# Patient Record
Sex: MALE | Race: ASIAN | NOT HISPANIC OR LATINO | Employment: FULL TIME | URBAN - NONMETROPOLITAN AREA
[De-identification: names, ages, dates, MRNs, and addresses within clinical notes are randomized per-mention and may not be internally consistent; named-entity substitution may affect disease eponyms.]

---

## 2023-03-22 ENCOUNTER — OFFICE VISIT (OUTPATIENT)
Dept: UROLOGY | Facility: CLINIC | Age: 43
End: 2023-03-22
Payer: MEDICAID

## 2023-03-22 VITALS — BODY MASS INDEX: 22.5 KG/M2 | WEIGHT: 140 LBS | HEIGHT: 66 IN

## 2023-03-22 DIAGNOSIS — R39.89 GENITAL SORE: ICD-10-CM

## 2023-03-22 DIAGNOSIS — B00.9 HERPES SIMPLEX VIRUS INFECTION: Primary | ICD-10-CM

## 2023-03-22 PROCEDURE — 1160F RVW MEDS BY RX/DR IN RCRD: CPT

## 2023-03-22 PROCEDURE — 86592 SYPHILIS TEST NON-TREP QUAL: CPT

## 2023-03-22 PROCEDURE — 99203 OFFICE O/P NEW LOW 30 MIN: CPT

## 2023-03-22 PROCEDURE — 86593 SYPHILIS TEST NON-TREP QUANT: CPT

## 2023-03-22 PROCEDURE — 1159F MED LIST DOCD IN RCRD: CPT

## 2023-03-22 PROCEDURE — 86780 TREPONEMA PALLIDUM: CPT

## 2023-03-22 RX ORDER — CLOTRIMAZOLE AND BETAMETHASONE DIPROPIONATE 10; .64 MG/G; MG/G
1 CREAM TOPICAL 2 TIMES DAILY
Qty: 45 G | Refills: 2 | Status: SHIPPED | OUTPATIENT
Start: 2023-03-22

## 2023-03-22 RX ORDER — ACYCLOVIR 400 MG/1
TABLET ORAL
Qty: 60 TABLET | Refills: 0 | Status: SHIPPED | OUTPATIENT
Start: 2023-03-22

## 2023-03-22 NOTE — PROGRESS NOTES
"Chief Complaint:    Chief Complaint   Patient presents with   • Infected testicle     New patient       Vital Signs:   Ht 167.6 cm (66\")   Wt 63.5 kg (140 lb)   BMI 22.60 kg/m²   Body mass index is 22.6 kg/m².      HPI:  Javier Hui is a 42 y.o. male who presents today for initial evaluation     History of Present Illness  Mr. Hui presents to the clinic herniotomy for evaluation of a genital sore.  He does not speak English well.  Patient reports he has a past medical history significant for herpes simplex virus.  He was placed on clotrimazole and valacyclovir twice daily in [facility] with minimal improvement in symptoms.  Denies any dysuria, difficulty urinating, testicular pain, scrotal swelling, fever, chills, or penile swelling. He was tested for chlamydia and gonorrhea which was negative.  He was also tested for HIV that was negative.  Denies any significant pain.  Reports mild tenderness to palpation.  On physical exam today he has multiple vesicles on the glans of the penis with a large red erythematous sore at the ventral base of the glans.  His brother was available on the phone for help with translation.      Past Medical History:  History reviewed. No pertinent past medical history.    Current Meds:  Current Outpatient Medications   Medication Sig Dispense Refill   • acyclovir (ZOVIRAX) 400 MG tablet Take two tablets by mouth twice daily for 5 days then 1 tablet twice daily after 60 tablet 0   • clotrimazole-betamethasone (Lotrisone) 1-0.05 % cream Apply 1 application topically to the appropriate area as directed 2 (Two) Times a Day. 45 g 2     No current facility-administered medications for this visit.        Allergies:   No Known Allergies     Past Surgical History:  History reviewed. No pertinent surgical history.    Social History:  Social History     Socioeconomic History   • Marital status:    Tobacco Use   • Smoking status: Never   • Smokeless tobacco: Current     Types: Chew "       Family History:  History reviewed. No pertinent family history.    Review of Systems:  Review of Systems   Constitutional: Negative for fatigue, fever and unexpected weight change.   Respiratory: Negative for chest tightness and shortness of breath.    Cardiovascular: Negative for chest pain.   Gastrointestinal: Negative for abdominal pain, constipation, diarrhea, nausea and vomiting.   Genitourinary: Positive for genital sores and penile pain. Negative for difficulty urinating, dysuria, frequency, penile swelling, testicular pain and urgency.   Skin: Negative for rash.   Psychiatric/Behavioral: Negative for confusion and suicidal ideas.       Physical Exam:  Physical Exam  Constitutional:       General: He is not in acute distress.     Appearance: Normal appearance.   HENT:      Head: Normocephalic and atraumatic.      Nose: Nose normal.      Mouth/Throat:      Mouth: Mucous membranes are moist.   Eyes:      Conjunctiva/sclera: Conjunctivae normal.   Cardiovascular:      Rate and Rhythm: Normal rate and regular rhythm.      Pulses: Normal pulses.      Heart sounds: Normal heart sounds.   Pulmonary:      Effort: Pulmonary effort is normal.      Breath sounds: Normal breath sounds.   Abdominal:      General: Bowel sounds are normal.      Palpations: Abdomen is soft.   Genitourinary:     Comments: Vesicles on the glans of the penis  Neuro fully 1 cm large erythematous sore on the ventral base of the glans of the penis.  Musculoskeletal:         General: Normal range of motion.      Cervical back: Normal range of motion.   Skin:     General: Skin is warm.   Neurological:      General: No focal deficit present.      Mental Status: He is alert and oriented to person, place, and time.   Psychiatric:         Mood and Affect: Mood normal.         Behavior: Behavior normal.         Thought Content: Thought content normal.         Judgment: Judgment normal.         Recent Image (CT and/or KUB):   CT Abdomen and Pelvis:  No results found for this or any previous visit.     CT Stone Protocol: No results found for this or any previous visit.     KUB: No results found for this or any previous visit.       Labs:  Brief Urine Lab Results     None        No results found for any previous visit.        Procedure: None  Procedures     I have reviewed and agree with the above PMH, PSH, FMH, social history, medications, allergies, and labs.      Assessment/Plan:   Problem List Items Addressed This Visit    None  Visit Diagnoses     Herpes simplex virus infection    -  Primary    Relevant Medications    acyclovir (ZOVIRAX) 400 MG tablet    Other Relevant Orders    RPR    Genital sore        Relevant Medications    clotrimazole-betamethasone (Lotrisone) 1-0.05 % cream    Other Relevant Orders    RPR          Health Maintenance:   Health Maintenance Due   Topic Date Due   • COVID-19 Vaccine (1) Never done   • TDAP/TD VACCINES (1 - Tdap) Never done   • INFLUENZA VACCINE  Never done   • HEPATITIS C SCREENING  Never done   • ANNUAL PHYSICAL  Never done        Smoking Counseling: Patient has never smoked.  Current user smokeless tobacco.  Counseling given.    Urine Incontinence: Patient reports that he is not currently experiencing any symptoms of urinary incontinence.    Patient was given instructions and counseling regarding his condition or for health maintenance advice. Please see specific information pulled into the AVS if appropriate.    Patient Education:   Herpes simplex virus -discussed with the patient and his brother the pathophysiology of herpes simplex virus.  Advised patient to use protection with sexual partners from increased risk of transmission at this time given open sores.  Patient on valacyclovir with minimal improvement.  Recommend to change his dose to acyclovir.  Discussed the risk and side effects of this medication.  I will also change the patient's clotrimazole ointment to Lotrisone twice daily.  Given patient's large  nonpainful sore I am recommending a test for syphilis.  I will obtain a RPR blood draw in office today.  Advised patient to clean area with warm soap and water twice daily.  Advised him to take medication as prescribed.  We will see him back in 1 month or sooner if needed.  Patient verbalized understanding.    Visit Diagnoses:    ICD-10-CM ICD-9-CM   1. Herpes simplex virus infection  B00.9 054.9   2. Genital sore  R39.89 789.9       Meds Ordered During Visit:  New Medications Ordered This Visit   Medications   • acyclovir (ZOVIRAX) 400 MG tablet     Sig: Take two tablets by mouth twice daily for 5 days then 1 tablet twice daily after     Dispense:  60 tablet     Refill:  0   • clotrimazole-betamethasone (Lotrisone) 1-0.05 % cream     Sig: Apply 1 application topically to the appropriate area as directed 2 (Two) Times a Day.     Dispense:  45 g     Refill:  2       Follow Up Appointment: 1 month  No follow-ups on file.      This document has been electronically signed by Kaleb Knight PA-C   March 22, 2023 12:07 EDT    Part of this note may be an electronic transcription/translation of spoken language to printed text using the Dragon Dictation System.

## 2023-03-23 LAB
RPR SER QL: REACTIVE
RPR SER-TITR: ABNORMAL {TITER}

## 2023-03-24 LAB — TREPONEMA PALLIDUM IGG+IGM AB [PRESENCE] IN SERUM OR PLASMA BY IMMUNOASSAY: REACTIVE

## 2023-03-27 ENCOUNTER — TELEPHONE (OUTPATIENT)
Dept: UROLOGY | Facility: CLINIC | Age: 43
End: 2023-03-27
Payer: MEDICAID

## 2023-03-27 DIAGNOSIS — A53.9 SYPHILIS: Primary | ICD-10-CM

## 2023-03-27 NOTE — TELEPHONE ENCOUNTER
Called patient to inform him of positive RPR results.  We will send him to outpatient infusion clinic for penicillin treatment.  Patient verbalized understanding.

## 2023-03-30 ENCOUNTER — INFUSION (OUTPATIENT)
Dept: ONCOLOGY | Facility: HOSPITAL | Age: 43
End: 2023-03-30
Payer: MEDICAID

## 2023-03-30 VITALS
BODY MASS INDEX: 28.54 KG/M2 | OXYGEN SATURATION: 99 % | HEART RATE: 84 BPM | DIASTOLIC BLOOD PRESSURE: 88 MMHG | RESPIRATION RATE: 18 BRPM | SYSTOLIC BLOOD PRESSURE: 139 MMHG | WEIGHT: 176.8 LBS | TEMPERATURE: 98.4 F

## 2023-03-30 DIAGNOSIS — A53.9 SYPHILIS: Primary | ICD-10-CM

## 2023-03-30 DIAGNOSIS — R39.89 GENITAL SORE: ICD-10-CM

## 2023-03-30 PROCEDURE — 96372 THER/PROPH/DIAG INJ SC/IM: CPT

## 2023-03-30 PROCEDURE — 25010000002 PENICILLIN G BENZATHINE PER 1200000 UNITS

## 2023-03-30 RX ADMIN — PENICILLIN G BENZATHINE 2.4 MILLION UNITS: 1200000 INJECTION, SUSPENSION INTRAMUSCULAR at 11:32

## 2023-04-17 ENCOUNTER — TELEPHONE (OUTPATIENT)
Dept: UROLOGY | Facility: CLINIC | Age: 43
End: 2023-04-17

## 2023-04-17 NOTE — TELEPHONE ENCOUNTER
Caller: Javier Hui    Relationship to patient: Self    Best call back number: [phone]    Patient is needing: PT HAS RECENTLY BEEN TREATED FOR SYPHILIS AND NEEDS TO GET TREATMENT FOR WIFE. PLEASE CONTACT PT TO LET THEM KNOW WHAT NEEDS TO BE DONE SINCE WIFE IS NOT PT WITH US

## 2023-04-19 ENCOUNTER — TELEPHONE (OUTPATIENT)
Dept: UROLOGY | Facility: CLINIC | Age: 43
End: 2023-04-19
Payer: MEDICAID

## 2023-04-19 NOTE — TELEPHONE ENCOUNTER
Called patients wife back to let her know that since she was not our patient she could go to the [facility] department for treatment for syphilis. Patients wife verbalized she had an appt at the health department on 4/25/23.

## 2023-04-24 ENCOUNTER — OFFICE VISIT (OUTPATIENT)
Dept: UROLOGY | Facility: CLINIC | Age: 43
End: 2023-04-24
Payer: MEDICAID

## 2023-04-24 VITALS
HEART RATE: 65 BPM | DIASTOLIC BLOOD PRESSURE: 80 MMHG | BODY MASS INDEX: 28.28 KG/M2 | WEIGHT: 176 LBS | SYSTOLIC BLOOD PRESSURE: 143 MMHG | HEIGHT: 66 IN

## 2023-04-24 DIAGNOSIS — A53.9 SYPHILIS: Primary | ICD-10-CM

## 2023-04-24 DIAGNOSIS — R39.89 GENITAL SORE: ICD-10-CM

## 2023-04-24 DIAGNOSIS — B00.9 HERPES SIMPLEX VIRUS INFECTION: ICD-10-CM

## 2023-04-24 RX ORDER — ACYCLOVIR 400 MG/1
TABLET ORAL
Qty: 60 TABLET | Refills: 0 | Status: SHIPPED | OUTPATIENT
Start: 2023-04-24

## 2023-04-24 RX ORDER — CLOTRIMAZOLE AND BETAMETHASONE DIPROPIONATE 10; .64 MG/G; MG/G
1 CREAM TOPICAL 2 TIMES DAILY
Qty: 45 G | Refills: 2 | Status: SHIPPED | OUTPATIENT
Start: 2023-04-24

## 2023-04-24 NOTE — PROGRESS NOTES
"Chief Complaint:    Chief Complaint   Patient presents with   • Herpes simplex virus     1 month follow up       Vital Signs:   /80 (BP Location: Left arm, Patient Position: Sitting)   Pulse 65   Ht 167.6 cm (65.98\")   Wt 79.8 kg (176 lb)   BMI 28.42 kg/m²   Body mass index is 28.42 kg/m².      HPI:  Javier Hui is a 42 y.o. male who presents today for follow up    History of Present Illness  Mr. Hui presents for 1 month follow-up for herpes simplex virus.  He does not speak English well and his brother is available on phone today for translation.  He had a genital sore at the last office visit I completed a blood test to check for syphilis.  His RPR titer came back positive at roughly 1:256.  I then gave the patient a dose of penicillin IM once.  Today in clinic he reports he is doing much better.  His genital sore is completely gone on physical exam.  He does still complain of some difficulty retraction of foreskin and there is mild balanitis noted.  I will send in a refill of acyclovir for the patient to take as needed for HSV outbreak.      Past Medical History:  History reviewed. No pertinent past medical history.    Current Meds:  Current Outpatient Medications   Medication Sig Dispense Refill   • acyclovir (ZOVIRAX) 400 MG tablet Take two tablets by mouth twice daily for 5 days then 1 tablet twice daily after 60 tablet 0   • clotrimazole-betamethasone (Lotrisone) 1-0.05 % cream Apply 1 application topically to the appropriate area as directed 2 (Two) Times a Day. 45 g 2     No current facility-administered medications for this visit.        Allergies:   No Known Allergies     Past Surgical History:  History reviewed. No pertinent surgical history.    Social History:  Social History     Socioeconomic History   • Marital status:    Tobacco Use   • Smoking status: Never   • Smokeless tobacco: Current     Types: Chew       Family History:  History reviewed. No pertinent family " history.    Review of Systems:  Review of Systems   Constitutional: Negative for fatigue, fever and unexpected weight change.   Respiratory: Negative for chest tightness and shortness of breath.    Cardiovascular: Negative for chest pain.   Gastrointestinal: Negative for abdominal pain, constipation, diarrhea, nausea and vomiting.   Genitourinary: Negative for difficulty urinating, dysuria, frequency, genital sores, penile pain, penile swelling, testicular pain and urgency.   Skin: Negative for rash.   Psychiatric/Behavioral: Negative for confusion and suicidal ideas.       Physical Exam:  Physical Exam  Constitutional:       General: He is not in acute distress.     Appearance: Normal appearance.   HENT:      Head: Normocephalic and atraumatic.      Nose: Nose normal.      Mouth/Throat:      Mouth: Mucous membranes are moist.   Eyes:      Conjunctiva/sclera: Conjunctivae normal.   Cardiovascular:      Rate and Rhythm: Normal rate and regular rhythm.      Pulses: Normal pulses.      Heart sounds: Normal heart sounds.   Pulmonary:      Effort: Pulmonary effort is normal.      Breath sounds: Normal breath sounds.   Abdominal:      General: Bowel sounds are normal.      Palpations: Abdomen is soft.      Tenderness: There is no right CVA tenderness or left CVA tenderness.   Genitourinary:     Penis: Normal.       Testes: Normal.      Comments: Genital sores completely gone  Musculoskeletal:         General: Normal range of motion.      Cervical back: Normal range of motion.   Skin:     General: Skin is warm.   Neurological:      General: No focal deficit present.      Mental Status: He is alert and oriented to person, place, and time.   Psychiatric:         Mood and Affect: Mood normal.         Behavior: Behavior normal.         Thought Content: Thought content normal.         Judgment: Judgment normal.       Recent Image (CT and/or KUB):   CT Abdomen and Pelvis: No results found for this or any previous visit.     CT  Stone Protocol: No results found for this or any previous visit.     KUB: No results found for this or any previous visit.       Labs:  Brief Urine Lab Results     None        Office Visit on 03/22/2023   Component Date Value Ref Range Status   • RPR 03/22/2023 Reactive (A)  Non-Reactive Final   • Treponema pallidum Antibodies 03/22/2023 Reactive (A)  Non Reactive Final   • RPR Titer 03/22/2023 Pos 1:256 (A)  Negative Final        Procedure: None  Procedures     I have reviewed and agree with the above PMH, PSH, FMH, social history, medications, allergies, and labs.      Assessment/Plan:   Problem List Items Addressed This Visit        Genitourinary and Reproductive     Genital sore    Relevant Medications    clotrimazole-betamethasone (Lotrisone) 1-0.05 % cream       Other    Herpes simplex virus infection    Relevant Medications    acyclovir (ZOVIRAX) 400 MG tablet    Syphilis - Primary       Health Maintenance:   Health Maintenance Due   Topic Date Due   • COVID-19 Vaccine (1) Never done   • TDAP/TD VACCINES (1 - Tdap) Never done   • HEPATITIS C SCREENING  Never done   • ANNUAL PHYSICAL  Never done        Smoking Counseling: Never smoked.  Current user smokeless tobacco.  Counseling given.  However not ready to quit at this time.    Urine Incontinence: Patient reports that he is not currently experiencing any symptoms of urinary incontinence.    Patient was given instructions and counseling regarding his condition or for health maintenance advice. Please see specific information pulled into the AVS if appropriate.    Patient Education:   Syphilis -patient came back positive on blood work roughly 1 month ago for syphilis.  He went IM injections in the outpatient setting roughly 3 weeks ago.  His genital sore today is completely gone away.  He denies any other significant problems at this time.  He is healing well.  Herpes simplex virus -patient has a past medical history significant for herpes simplex virus.  I  will resend in acyclovir 4 mg to take as needed.  Advised the patient to take only with a concurrent outbreak.  Balanitis -patient has mild balanitis noted on exam today.  I will send in Lotrisone cream to apply twice daily as needed.  Advised him to take no longer than 4 weeks.  Patient would like to follow-up with me in 1 month.    Visit Diagnoses:    ICD-10-CM ICD-9-CM   1. Syphilis  A53.9 097.9   2. Herpes simplex virus infection  B00.9 054.9   3. Genital sore  R39.89 789.9       Meds Ordered During Visit:  New Medications Ordered This Visit   Medications   • acyclovir (ZOVIRAX) 400 MG tablet     Sig: Take two tablets by mouth twice daily for 5 days then 1 tablet twice daily after     Dispense:  60 tablet     Refill:  0   • clotrimazole-betamethasone (Lotrisone) 1-0.05 % cream     Sig: Apply 1 application topically to the appropriate area as directed 2 (Two) Times a Day.     Dispense:  45 g     Refill:  2       Follow Up Appointment: 1 month  No follow-ups on file.      This document has been electronically signed by Kaleb Knight PA-C   April 24, 2023 13:02 EDT    Part of this note may be an electronic transcription/translation of spoken language to printed text using the Dragon Dictation System.

## 2023-06-05 ENCOUNTER — OFFICE VISIT (OUTPATIENT)
Dept: UROLOGY | Facility: CLINIC | Age: 43
End: 2023-06-05
Payer: MEDICAID

## 2023-06-05 VITALS
BODY MASS INDEX: 27.48 KG/M2 | SYSTOLIC BLOOD PRESSURE: 129 MMHG | HEIGHT: 66 IN | WEIGHT: 171 LBS | DIASTOLIC BLOOD PRESSURE: 79 MMHG | HEART RATE: 79 BPM

## 2023-06-05 DIAGNOSIS — A53.9 SYPHILIS: Primary | ICD-10-CM

## 2023-06-05 DIAGNOSIS — R39.89 GENITAL SORE: ICD-10-CM

## 2023-06-05 PROCEDURE — 99213 OFFICE O/P EST LOW 20 MIN: CPT

## 2023-06-05 PROCEDURE — 86593 SYPHILIS TEST NON-TREP QUANT: CPT

## 2023-06-05 PROCEDURE — 86592 SYPHILIS TEST NON-TREP QUAL: CPT

## 2023-06-05 PROCEDURE — 1160F RVW MEDS BY RX/DR IN RCRD: CPT

## 2023-06-05 PROCEDURE — 1159F MED LIST DOCD IN RCRD: CPT

## 2023-06-05 PROCEDURE — 86780 TREPONEMA PALLIDUM: CPT

## 2023-06-05 NOTE — PROGRESS NOTES
"Chief Complaint:    Chief Complaint   Patient presents with    Review Results        Vital Signs:   /79 (BP Location: Right arm, Patient Position: Sitting)   Pulse 79   Ht 167.6 cm (65.98\")   Wt 77.6 kg (171 lb)   BMI 27.61 kg/m²   Body mass index is 27.61 kg/m².      HPI:  Javier Hui is a 43 y.o. male who presents today for follow up    History of Present Illness  Mr. Hui presents to the clinic for follow-up.  Initially saw in my office roughly 2 to 3 months ago and was diagnosed with primary syphilis after appropriate lab testing.  He was treated appropriately with 2,400,000 units of penicillin IM.  Patient had complete resolution of symptoms at last office visit.  Given the patient's language barrier we did use an in office virtual .  He reports that roughly 1 week ago his genital sore came back.  He denies any significant pain with sore.  His wife was also treated and he reports that she has had no problems since receiving her IM treatment with penicillin.  On physical exam today patient does have a significant canker with retraction of foreskin on the base of the glans.  I like to retest an RPR in office today for evaluation of his titer.    Past Medical History:  History reviewed. No pertinent past medical history.    Current Meds:  Current Outpatient Medications   Medication Sig Dispense Refill    acyclovir (ZOVIRAX) 400 MG tablet Take two tablets by mouth twice daily for 5 days then 1 tablet twice daily after 60 tablet 0    clotrimazole-betamethasone (Lotrisone) 1-0.05 % cream Apply 1 application topically to the appropriate area as directed 2 (Two) Times a Day. 45 g 2     No current facility-administered medications for this visit.        Allergies:   No Known Allergies     Past Surgical History:  History reviewed. No pertinent surgical history.    Social History:  Social History     Socioeconomic History    Marital status:    Tobacco Use    Smoking status: Never    Smokeless " tobacco: Current     Types: Chew   Vaping Use    Vaping Use: Never used   Substance and Sexual Activity    Alcohol use: Never    Drug use: Never    Sexual activity: Never       Family History:  History reviewed. No pertinent family history.    Review of Systems:  Review of Systems   Constitutional:  Negative for chills, fatigue and fever.   Respiratory:  Negative for cough, shortness of breath and wheezing.    Cardiovascular:  Negative for leg swelling.   Gastrointestinal:  Negative for abdominal pain, nausea and vomiting.   Genitourinary:  Positive for genital sores. Negative for difficulty urinating, dysuria, flank pain, hematuria and penile pain.   Musculoskeletal:  Negative for back pain and joint swelling.   Neurological:  Negative for dizziness and headaches.   Psychiatric/Behavioral:  Negative for confusion.      Physical Exam:  Physical Exam  Constitutional:       General: He is not in acute distress.     Appearance: Normal appearance.   HENT:      Head: Normocephalic and atraumatic.      Nose: Nose normal.      Mouth/Throat:      Mouth: Mucous membranes are moist.   Eyes:      Conjunctiva/sclera: Conjunctivae normal.   Cardiovascular:      Rate and Rhythm: Normal rate and regular rhythm.      Pulses: Normal pulses.      Heart sounds: Normal heart sounds.   Pulmonary:      Effort: Pulmonary effort is normal.      Breath sounds: Normal breath sounds.   Abdominal:      General: Bowel sounds are normal.      Palpations: Abdomen is soft.   Genitourinary:     Comments: Genital sore on glans of penis  Musculoskeletal:         General: Normal range of motion.      Cervical back: Normal range of motion.   Skin:     General: Skin is warm.   Neurological:      General: No focal deficit present.      Mental Status: He is alert and oriented to person, place, and time.   Psychiatric:         Mood and Affect: Mood normal.         Behavior: Behavior normal.         Thought Content: Thought content normal.         Judgment:  Judgment normal.             Recent Image (CT and/or KUB):   CT Abdomen and Pelvis: No results found for this or any previous visit.     CT Stone Protocol: No results found for this or any previous visit.     KUB: No results found for this or any previous visit.       Labs:  Brief Urine Lab Results       None          Office Visit on 03/22/2023   Component Date Value Ref Range Status    RPR 03/22/2023 Reactive (A)  Non-Reactive Final    Treponema pallidum Antibodies 03/22/2023 Reactive (A)  Non Reactive Final    RPR Titer 03/22/2023 Pos 1:256 (A)  Negative Final        Procedure: None  Procedures    I have reviewed and agree with the above PMH, PSH, FMH, social history, medications, allergies, and labs.      Assessment/Plan:   Problem List Items Addressed This Visit          Genitourinary and Reproductive     Genital sore    Relevant Orders    RPR       Other    Syphilis - Primary    Relevant Orders    RPR       Health Maintenance:   Health Maintenance Due   Topic Date Due    COLORECTAL CANCER SCREENING  Never done    COVID-19 Vaccine (1) Never done    TDAP/TD VACCINES (1 - Tdap) Never done    HEPATITIS C SCREENING  Never done    ANNUAL PHYSICAL  Never done        Smoking Counseling: Never smoked.  Current user smokeless tobacco.  Counseling given however not ready to quit at this time    Urine Incontinence: Patient reports that he is not currently experiencing any symptoms of urinary incontinence.    Patient was given instructions and counseling regarding his condition or for health maintenance advice. Please see specific information pulled into the AVS if appropriate.    Patient Education:   Syphilis/genital sore -patient presents for follow-up concerns of recurrence.  He does have a recurrence of his canker wart on the glans of the penis with retraction of foreskin.  I did discuss with the patient syphilis including pathophysiology of this disease.  Advised patient this is a sexually transmitted infection that  all partner should be treated.  His wife was treated in office and reports resolution of symptoms.  I would like to repeat an RPR in office today with titer to test for resolution of antibodies.  Advised patient that if titer is still high we will proceed forward with another injection of penicillin.  I did advise the patient that we could begin doxycycline orally however he would like to wait until results first.  I will call patient with test results once obtained.  Patient verbalized understanding and agreed to plan of care.    Visit Diagnoses:    ICD-10-CM ICD-9-CM   1. Syphilis  A53.9 097.9   2. Genital sore  R39.89 789.9       Meds Ordered During Visit:  No orders of the defined types were placed in this encounter.      Follow Up Appointment: Pending test results  No follow-ups on file.      This document has been electronically signed by Kaleb Knight PA-C   June 5, 2023 20:55 EDT    Part of this note may be an electronic transcription/translation of spoken language to printed text using the Dragon Dictation System.

## 2023-06-06 ENCOUNTER — TELEPHONE (OUTPATIENT)
Dept: UROLOGY | Facility: CLINIC | Age: 43
End: 2023-06-06
Payer: MEDICAID

## 2023-06-06 DIAGNOSIS — A53.9 SYPHILIS: Primary | ICD-10-CM

## 2023-06-06 LAB
RPR SER QL: REACTIVE
RPR SER-TITR: ABNORMAL {TITER}

## 2023-06-06 RX ORDER — DOXYCYCLINE HYCLATE 100 MG/1
100 CAPSULE ORAL EVERY 12 HOURS
Qty: 28 CAPSULE | Refills: 0 | Status: SHIPPED | OUTPATIENT
Start: 2023-06-06 | End: 2023-06-20

## 2023-06-06 NOTE — TELEPHONE ENCOUNTER
Called patient back to inform him that titer results are still showing positive.  He is overall titer had decreased from a 1: 256 to a 1: 32.  Given the patient still symptomatic with present sore and positive lab work and on doxycycline twice daily for 14 days.  Advised patient that there is a shortage of penicillin injections at this time.  We will have him follow-up in roughly 1-2 months for reevaluation. Patient verbalized understanding.

## 2023-06-07 LAB — TREPONEMA PALLIDUM IGG+IGM AB [PRESENCE] IN SERUM OR PLASMA BY IMMUNOASSAY: REACTIVE

## 2023-07-27 ENCOUNTER — OFFICE VISIT (OUTPATIENT)
Dept: UROLOGY | Facility: CLINIC | Age: 43
End: 2023-07-27
Payer: MEDICAID

## 2023-07-27 VITALS
HEIGHT: 66 IN | HEART RATE: 84 BPM | DIASTOLIC BLOOD PRESSURE: 86 MMHG | BODY MASS INDEX: 28.45 KG/M2 | SYSTOLIC BLOOD PRESSURE: 139 MMHG | WEIGHT: 177 LBS

## 2023-07-27 DIAGNOSIS — R39.89 GENITAL SORE: Primary | ICD-10-CM

## 2023-07-27 NOTE — PROGRESS NOTES
"Chief Complaint:    Chief Complaint   Patient presents with    Syphilis       Vital Signs:   /86   Pulse 84   Ht 167.6 cm (65.98\")   Wt 80.3 kg (177 lb)   BMI 28.58 kg/m²   Body mass index is 28.58 kg/m².      HPI:  Javier Hui is a 43 y.o. male who presents today for follow up    History of Present Illness  Mr. Hui presents to our clinic for concerns of recurrent sexually transmitted infection.  He has a past medical history for HSV 1 and 2 was well stable.  Has been tested in the past twice for HIV which have both been negative.  Patient is followed along with the practice well and was treated initially with penicillin 2,400,000 units injections 1 time.  He was then given a 14-day course of doxycycline due to recurrence of genital sore.  His titer has significantly improved from a 1:264 ratio to a 1:16 ratio.  He recently just finished another 10-day course of doxycycline.  He reports this morning he had a small lesion on the left side of his glans of the penis at the junction foreskin.  Physical exam today this is just a small tear.  I reassured the patient.    Past Medical History:  History reviewed. No pertinent past medical history.    Current Meds:  Current Outpatient Medications   Medication Sig Dispense Refill    clotrimazole-betamethasone (Lotrisone) 1-0.05 % cream Apply 1 application topically to the appropriate area as directed 2 (Two) Times a Day. 45 g 2     No current facility-administered medications for this visit.        Allergies:   No Known Allergies     Past Surgical History:  History reviewed. No pertinent surgical history.    Social History:  Social History     Socioeconomic History    Marital status:    Tobacco Use    Smoking status: Never    Smokeless tobacco: Current     Types: Chew   Vaping Use    Vaping Use: Never used   Substance and Sexual Activity    Alcohol use: Never    Drug use: Never    Sexual activity: Never       Family History:  History reviewed. No pertinent " family history.    Review of Systems:  Review of Systems   Constitutional:  Negative for chills, fatigue, fever and unexpected weight change.   HENT:  Negative for congestion and sinus pressure.    Respiratory:  Negative for chest tightness and shortness of breath.    Cardiovascular:  Negative for chest pain.   Gastrointestinal:  Positive for abdominal pain. Negative for constipation, diarrhea, nausea and vomiting.   Genitourinary:  Positive for frequency and genital sores. Negative for difficulty urinating, dysuria, flank pain, hematuria, penile pain and urgency.   Musculoskeletal:  Negative for back pain and neck pain.   Skin:  Negative for rash.   Neurological:  Negative for dizziness and headaches.   Hematological:  Does not bruise/bleed easily.   Psychiatric/Behavioral:  Negative for confusion and suicidal ideas. The patient is not nervous/anxious.      Physical Exam:  Physical Exam  Constitutional:       General: He is not in acute distress.     Appearance: Normal appearance.   HENT:      Head: Normocephalic and atraumatic.      Nose: Nose normal.      Mouth/Throat:      Mouth: Mucous membranes are moist.   Eyes:      Conjunctiva/sclera: Conjunctivae normal.   Cardiovascular:      Rate and Rhythm: Normal rate and regular rhythm.      Pulses: Normal pulses.      Heart sounds: Normal heart sounds.   Pulmonary:      Effort: Pulmonary effort is normal.      Breath sounds: Normal breath sounds.   Abdominal:      General: Bowel sounds are normal.      Palpations: Abdomen is soft.   Genitourinary:     Comments: Small tear of the foreskin of the penis  Musculoskeletal:         General: Normal range of motion.      Cervical back: Normal range of motion.   Skin:     General: Skin is warm.   Neurological:      General: No focal deficit present.      Mental Status: He is alert and oriented to person, place, and time.   Psychiatric:         Mood and Affect: Mood normal.         Behavior: Behavior normal.         Thought  Content: Thought content normal.         Judgment: Judgment normal.           Recent Image (CT and/or KUB):   CT Abdomen and Pelvis: No results found for this or any previous visit.     CT Stone Protocol: No results found for this or any previous visit.     KUB: No results found for this or any previous visit.       Labs:  Brief Urine Lab Results       None          Office Visit on 06/27/2023   Component Date Value Ref Range Status    RPR 06/27/2023 Reactive (A)  Non-Reactive Final    Glucose 06/27/2023 112 (H)  65 - 99 mg/dL Final    BUN 06/27/2023 5 (L)  6 - 20 mg/dL Final    Creatinine 06/27/2023 0.71 (L)  0.76 - 1.27 mg/dL Final    Sodium 06/27/2023 136  136 - 145 mmol/L Final    Potassium 06/27/2023 4.4  3.5 - 5.2 mmol/L Final    Chloride 06/27/2023 98  98 - 107 mmol/L Final    CO2 06/27/2023 27.0  22.0 - 29.0 mmol/L Final    Calcium 06/27/2023 10.4  8.6 - 10.5 mg/dL Final    Total Protein 06/27/2023 7.6  6.0 - 8.5 g/dL Final    Albumin 06/27/2023 5.2  3.5 - 5.2 g/dL Final    ALT (SGPT) 06/27/2023 19  1 - 41 U/L Final    AST (SGOT) 06/27/2023 18  1 - 40 U/L Final    Alkaline Phosphatase 06/27/2023 45  39 - 117 U/L Final    Total Bilirubin 06/27/2023 0.4  0.0 - 1.2 mg/dL Final    Globulin 06/27/2023 2.4  gm/dL Final    A/G Ratio 06/27/2023 2.2  g/dL Final    BUN/Creatinine Ratio 06/27/2023 7.0  7.0 - 25.0 Final    Anion Gap 06/27/2023 11.0  5.0 - 15.0 mmol/L Final    eGFR 06/27/2023 115.3  >60.0 mL/min/1.73 Final    HIV-1/ HIV-2 06/27/2023 Non-Reactive  Non-Reactive Final    A non-reactive test result does not preclude the possibility of exposure to HIV or infection with HIV. An antibody response to recent exposure may take several months to reach detectable levels.    Treponema pallidum Antibodies 06/27/2023 Reactive (A)  Non Reactive Final    RPR Titer 06/27/2023 Pos 1:16 (A)  Negative Final   Office Visit on 06/05/2023   Component Date Value Ref Range Status    RPR 06/05/2023 Reactive (A)  Non-Reactive Final     Treponema pallidum Antibodies 06/05/2023 Reactive (A)  Non Reactive Final    RPR Titer 06/05/2023 Pos 1:32 (A)  Negative Final        Procedure: None  Procedures     I have reviewed and agree with the above PMH, PSH, FMH, social history, medications, allergies, and labs.     Assessment/Plan:   Problem List Items Addressed This Visit          Genitourinary and Reproductive     Genital sore - Primary       Health Maintenance:   Health Maintenance Due   Topic Date Due    COLORECTAL CANCER SCREENING  Never done    COVID-19 Vaccine (1) Never done    TDAP/TD VACCINES (1 - Tdap) Never done    HEPATITIS C SCREENING  Never done    ANNUAL PHYSICAL  Never done        Smoking Counseling: Never smoked.  Current user smokeless tobacco.  Counseling given however not ready to quit at this time.    Urine Incontinence: Patient reports that he is not currently experiencing any symptoms of urinary incontinence.    Patient was given instructions and counseling regarding his condition or for health maintenance advice. Please see specific information pulled into the AVS if appropriate.    Patient Education:   Genital sore -patient has a small tear of the glans of the penis with retraction of foreskin noted today after sexual activity.  Did reassure the patient.  Advised him to clean the area.  Otherwise daily advised him to use Lotrisone cream as needed for balanitis/yeast infection.  Did advise patient that if symptoms do not improve or worsen over the course of the next few days to call with any concerns.  Otherwise we will see him back for his follow-up appointment in 6 months.    Visit Diagnoses:    ICD-10-CM ICD-9-CM   1. Genital sore  R39.89 789.9       Meds Ordered During Visit:  No orders of the defined types were placed in this encounter.      Follow Up Appointment: 6 months  No follow-ups on file.      This document has been electronically signed by Kaleb Knight PA-C   July 27, 2023 22:00 EDT    Part of this note may be  an electronic transcription/translation of spoken language to printed text using the Dragon Dictation System.

## 2023-07-31 ENCOUNTER — TELEPHONE (OUTPATIENT)
Dept: UROLOGY | Facility: CLINIC | Age: 43
End: 2023-07-31
Payer: MEDICAID

## 2023-09-11 ENCOUNTER — OFFICE VISIT (OUTPATIENT)
Dept: UROLOGY | Facility: CLINIC | Age: 43
End: 2023-09-11
Payer: MEDICAID

## 2023-09-11 VITALS
DIASTOLIC BLOOD PRESSURE: 89 MMHG | HEIGHT: 66 IN | WEIGHT: 179.6 LBS | HEART RATE: 89 BPM | SYSTOLIC BLOOD PRESSURE: 153 MMHG | BODY MASS INDEX: 28.87 KG/M2

## 2023-09-11 DIAGNOSIS — R39.89 GENITAL SORE: ICD-10-CM

## 2023-09-11 DIAGNOSIS — B00.9 HERPES SIMPLEX VIRUS INFECTION: Primary | ICD-10-CM

## 2023-09-11 PROCEDURE — 1159F MED LIST DOCD IN RCRD: CPT

## 2023-09-11 PROCEDURE — 1160F RVW MEDS BY RX/DR IN RCRD: CPT

## 2023-09-11 PROCEDURE — 99213 OFFICE O/P EST LOW 20 MIN: CPT

## 2023-09-11 RX ORDER — VALACYCLOVIR HYDROCHLORIDE 500 MG/1
500 TABLET, FILM COATED ORAL EVERY 12 HOURS
Qty: 6 TABLET | Refills: 3 | Status: SHIPPED | OUTPATIENT
Start: 2023-09-11

## 2023-09-11 RX ORDER — DOXYCYCLINE HYCLATE 100 MG/1
100 CAPSULE ORAL 2 TIMES DAILY
Qty: 14 CAPSULE | Refills: 0 | Status: SHIPPED | OUTPATIENT
Start: 2023-09-11 | End: 2023-09-18

## 2023-09-11 NOTE — PROGRESS NOTES
"Chief Complaint:    Chief Complaint   Patient presents with    Syphilis       Vital Signs:   /89 (BP Location: Right arm, Patient Position: Sitting)   Pulse 89   Ht 167.6 cm (65.98\")   Wt 81.5 kg (179 lb 9.6 oz)   BMI 29.00 kg/m²   Body mass index is 29 kg/m².      HPI:  Javier Hui is a 43 y.o. male who presents today for follow up    History of Present Illness  Mr. Hui presents to the clinic with his brother for a follow-up for syphilis and HSV.  Patient reports that he had a small tear in his foreskin and has been applying Lotrisone cream with some improvement.  He also states he has had a small sore develop on the glans of the penis.  States that he has some burning.  He denies any new sexual partners.  He denies any penile discharge, dysuria, frequency, urgency, gross hematuria, or other genital sores.  Physical exam today he has a small HSV-1 sore and tear in the penile skin at the base of the glans.  Reports that the HSV sore popped up yesterday not.  There is notable Lotrisone cream applied.  Otherwise no other abnormalities.    Past Medical History:  History reviewed. No pertinent past medical history.    Current Meds:  Current Outpatient Medications   Medication Sig Dispense Refill    clotrimazole-betamethasone (Lotrisone) 1-0.05 % cream Apply 1 application topically to the appropriate area as directed 2 (Two) Times a Day. 45 g 2    doxycycline (VIBRAMYCIN) 100 MG capsule Take 1 capsule by mouth 2 (Two) Times a Day for 7 days. 14 capsule 0    valACYclovir (VALTREX) 500 MG tablet Take 1 tablet by mouth Every 12 (Twelve) Hours. 6 tablet 3     No current facility-administered medications for this visit.        Allergies:   No Known Allergies     Past Surgical History:  History reviewed. No pertinent surgical history.    Social History:  Social History     Socioeconomic History    Marital status:    Tobacco Use    Smoking status: Never    Smokeless tobacco: Current     Types: Chew   Vaping " Use    Vaping Use: Never used   Substance and Sexual Activity    Alcohol use: Never    Drug use: Never    Sexual activity: Never       Family History:  History reviewed. No pertinent family history.    Review of Systems:  Review of Systems   Constitutional:  Negative for chills, fatigue and fever.   Respiratory:  Negative for cough, shortness of breath and wheezing.    Cardiovascular:  Negative for leg swelling.   Gastrointestinal:  Negative for abdominal pain, nausea and vomiting.   Genitourinary:  Positive for genital sores. Negative for difficulty urinating, dysuria, penile swelling, scrotal swelling and testicular pain.   Musculoskeletal:  Negative for back pain and joint swelling.   Neurological:  Negative for dizziness and headaches.   Psychiatric/Behavioral:  Negative for confusion.      Physical Exam:  Physical Exam  Constitutional:       General: He is not in acute distress.     Appearance: Normal appearance.   HENT:      Head: Normocephalic and atraumatic.      Nose: Nose normal.      Mouth/Throat:      Mouth: Mucous membranes are moist.   Eyes:      Conjunctiva/sclera: Conjunctivae normal.   Cardiovascular:      Rate and Rhythm: Normal rate and regular rhythm.      Pulses: Normal pulses.      Heart sounds: Normal heart sounds.   Pulmonary:      Effort: Pulmonary effort is normal.      Breath sounds: Normal breath sounds.   Abdominal:      General: Bowel sounds are normal.      Palpations: Abdomen is soft.      Tenderness: There is no right CVA tenderness or left CVA tenderness.   Genitourinary:     Comments: Small HSV 1 sore on the glans of the penis.  Small tear at the base of the glans.  Notable Lotrisone cream applied.  Musculoskeletal:         General: Normal range of motion.      Cervical back: Normal range of motion.   Skin:     General: Skin is warm.   Neurological:      General: No focal deficit present.      Mental Status: He is alert and oriented to person, place, and time.   Psychiatric:          Mood and Affect: Mood normal.         Behavior: Behavior normal.         Thought Content: Thought content normal.         Judgment: Judgment normal.                  Recent Image (CT and/or KUB):   CT Abdomen and Pelvis: No results found for this or any previous visit.     CT Stone Protocol: No results found for this or any previous visit.     KUB: No results found for this or any previous visit.       Labs:  Brief Urine Lab Results       None          Office Visit on 06/27/2023   Component Date Value Ref Range Status    RPR 06/27/2023 Reactive (A)  Non-Reactive Final    Glucose 06/27/2023 112 (H)  65 - 99 mg/dL Final    BUN 06/27/2023 5 (L)  6 - 20 mg/dL Final    Creatinine 06/27/2023 0.71 (L)  0.76 - 1.27 mg/dL Final    Sodium 06/27/2023 136  136 - 145 mmol/L Final    Potassium 06/27/2023 4.4  3.5 - 5.2 mmol/L Final    Chloride 06/27/2023 98  98 - 107 mmol/L Final    CO2 06/27/2023 27.0  22.0 - 29.0 mmol/L Final    Calcium 06/27/2023 10.4  8.6 - 10.5 mg/dL Final    Total Protein 06/27/2023 7.6  6.0 - 8.5 g/dL Final    Albumin 06/27/2023 5.2  3.5 - 5.2 g/dL Final    ALT (SGPT) 06/27/2023 19  1 - 41 U/L Final    AST (SGOT) 06/27/2023 18  1 - 40 U/L Final    Alkaline Phosphatase 06/27/2023 45  39 - 117 U/L Final    Total Bilirubin 06/27/2023 0.4  0.0 - 1.2 mg/dL Final    Globulin 06/27/2023 2.4  gm/dL Final    A/G Ratio 06/27/2023 2.2  g/dL Final    BUN/Creatinine Ratio 06/27/2023 7.0  7.0 - 25.0 Final    Anion Gap 06/27/2023 11.0  5.0 - 15.0 mmol/L Final    eGFR 06/27/2023 115.3  >60.0 mL/min/1.73 Final    HIV-1/ HIV-2 06/27/2023 Non-Reactive  Non-Reactive Final    A non-reactive test result does not preclude the possibility of exposure to HIV or infection with HIV. An antibody response to recent exposure may take several months to reach detectable levels.    Treponema pallidum Antibodies 06/27/2023 Reactive (A)  Non Reactive Final    RPR Titer 06/27/2023 Pos 1:16 (A)  Negative Final        Procedure:  None  Procedures     I have reviewed and agree with the above PMH, PSH, FMH, social history, medications, allergies, and labs.     Assessment/Plan:   Problem List Items Addressed This Visit          Genitourinary and Reproductive     Genital sore    Relevant Medications    doxycycline (VIBRAMYCIN) 100 MG capsule       Other    Herpes simplex virus infection - Primary    Relevant Medications    valACYclovir (VALTREX) 500 MG tablet       Health Maintenance:   Health Maintenance Due   Topic Date Due    BMI FOLLOWUP  Never done    COLORECTAL CANCER SCREENING  Never done    COVID-19 Vaccine (1) Never done    TDAP/TD VACCINES (1 - Tdap) Never done    HEPATITIS C SCREENING  Never done    ANNUAL PHYSICAL  Never done        Smoking Counseling: Never smoked.  Current user smokeless tobacco.  Counseling given however not ready to quit at this time.    Urine Incontinence: Patient reports that he is not currently experiencing any symptoms of urinary incontinence.    Patient was given instructions and counseling regarding his condition or for health maintenance advice. Please see specific information pulled into the AVS if appropriate.    Patient Education:   HSV -discussed the pathophysiology of HSV 1 and 2.  Discussed treatment which can include conservative versus antiviral therapy.  Discussed the use of valacyclovir as needed every 12 hours for recurrence.  Also discussed the use of valacyclovir 500 mg daily to help with suppression of virus.  Discussed the risk and benefits of these medications with the patient.  I will give the patient a 3-day supply of valacyclovir 5 mg twice daily.  Advised him to continue with Lotrisone cream.  I will also give the patient another refill of doxycycline 100 mg twice daily for 7 days to take as needed.  Advised him to hold off on this medication until this following Monday and begin if symptoms do not improve.  Otherwise we will keep the patient's follow-up in December for repeat RPR titer  for previous syphilis.  Advised him to call with any questions or concerns.  Patient verbalized understanding and agreed to plan of care.    Visit Diagnoses:    ICD-10-CM ICD-9-CM   1. Herpes simplex virus infection  B00.9 054.9   2. Genital sore  R39.89 789.9       Meds Ordered During Visit:  New Medications Ordered This Visit   Medications    valACYclovir (VALTREX) 500 MG tablet     Sig: Take 1 tablet by mouth Every 12 (Twelve) Hours.     Dispense:  6 tablet     Refill:  3    doxycycline (VIBRAMYCIN) 100 MG capsule     Sig: Take 1 capsule by mouth 2 (Two) Times a Day for 7 days.     Dispense:  14 capsule     Refill:  0       Follow Up Appointment: Roughly 3 months  No follow-ups on file.      This document has been electronically signed by Kaleb Knight PA-C   September 11, 2023 13:43 EDT    Part of this note may be an electronic transcription/translation of spoken language to printed text using the Dragon Dictation System.

## 2023-12-22 ENCOUNTER — OFFICE VISIT (OUTPATIENT)
Dept: UROLOGY | Facility: CLINIC | Age: 43
End: 2023-12-22
Payer: MEDICAID

## 2023-12-22 VITALS
HEART RATE: 90 BPM | HEIGHT: 66 IN | SYSTOLIC BLOOD PRESSURE: 150 MMHG | BODY MASS INDEX: 30.6 KG/M2 | WEIGHT: 190.4 LBS | DIASTOLIC BLOOD PRESSURE: 95 MMHG

## 2023-12-22 DIAGNOSIS — R39.89 GENITAL SORE: ICD-10-CM

## 2023-12-22 DIAGNOSIS — A53.9 SYPHILIS: Primary | ICD-10-CM

## 2023-12-22 PROCEDURE — 86592 SYPHILIS TEST NON-TREP QUAL: CPT

## 2023-12-22 RX ORDER — CLOTRIMAZOLE AND BETAMETHASONE DIPROPIONATE 10; .64 MG/G; MG/G
1 CREAM TOPICAL 2 TIMES DAILY
Qty: 45 G | Refills: 3 | Status: SHIPPED | OUTPATIENT
Start: 2023-12-22

## 2023-12-23 LAB — RPR SER QL: NORMAL

## 2023-12-26 ENCOUNTER — TELEPHONE (OUTPATIENT)
Dept: UROLOGY | Facility: CLINIC | Age: 43
End: 2023-12-26
Payer: MEDICAID

## 2023-12-26 NOTE — TELEPHONE ENCOUNTER
Called patient and advised him that RPR showed no reactivity.  Will see him back as needed.  Patient verbalized understanding.